# Patient Record
Sex: FEMALE | Race: BLACK OR AFRICAN AMERICAN | NOT HISPANIC OR LATINO | Employment: FULL TIME | URBAN - METROPOLITAN AREA
[De-identification: names, ages, dates, MRNs, and addresses within clinical notes are randomized per-mention and may not be internally consistent; named-entity substitution may affect disease eponyms.]

---

## 2017-10-24 ENCOUNTER — TELEPHONE (OUTPATIENT)
Dept: RADIOLOGY | Facility: HOSPITAL | Age: 32
End: 2017-10-24

## 2017-10-25 ENCOUNTER — HOSPITAL ENCOUNTER (OUTPATIENT)
Dept: RADIOLOGY | Facility: HOSPITAL | Age: 32
Discharge: HOME | End: 2017-10-25
Attending: INTERNAL MEDICINE
Payer: COMMERCIAL

## 2017-10-25 DIAGNOSIS — E04.9 GOITER: ICD-10-CM

## 2017-10-25 PROCEDURE — 76536 US EXAM OF HEAD AND NECK: CPT | Mod: TC,PO

## 2017-10-25 PROCEDURE — 76536 US EXAM OF HEAD AND NECK: CPT | Mod: 26,,, | Performed by: RADIOLOGY

## 2017-10-29 PROBLEM — E04.9 GOITER: Status: ACTIVE | Noted: 2017-10-29

## 2017-10-29 PROBLEM — Z00.00 ANNUAL PHYSICAL EXAM: Status: ACTIVE | Noted: 2017-10-29

## 2018-01-29 PROBLEM — Z00.00 ANNUAL PHYSICAL EXAM: Status: RESOLVED | Noted: 2017-10-29 | Resolved: 2018-01-29

## 2023-11-12 ENCOUNTER — OFFICE VISIT (OUTPATIENT)
Dept: URGENT CARE | Facility: CLINIC | Age: 38
End: 2023-11-12
Payer: COMMERCIAL

## 2023-11-12 VITALS
BODY MASS INDEX: 32.82 KG/M2 | WEIGHT: 162.81 LBS | HEART RATE: 84 BPM | TEMPERATURE: 99 F | SYSTOLIC BLOOD PRESSURE: 140 MMHG | HEIGHT: 59 IN | RESPIRATION RATE: 19 BRPM | DIASTOLIC BLOOD PRESSURE: 84 MMHG | OXYGEN SATURATION: 100 %

## 2023-11-12 DIAGNOSIS — L29.9 PRURITUS: ICD-10-CM

## 2023-11-12 DIAGNOSIS — B35.0 TINEA CAPITIS: Primary | ICD-10-CM

## 2023-11-12 PROCEDURE — 99203 PR OFFICE/OUTPT VISIT, NEW, LEVL III, 30-44 MIN: ICD-10-PCS | Mod: S$GLB,,, | Performed by: NURSE PRACTITIONER

## 2023-11-12 PROCEDURE — 99203 OFFICE O/P NEW LOW 30 MIN: CPT | Mod: S$GLB,,, | Performed by: NURSE PRACTITIONER

## 2023-11-12 RX ORDER — GRISEOFULVIN 250 MG/1
500 TABLET ORAL DAILY
Qty: 42 TABLET | Refills: 0 | Status: SHIPPED | OUTPATIENT
Start: 2023-11-12 | End: 2023-12-24

## 2023-11-12 RX ORDER — KETOCONAZOLE 20 MG/ML
SHAMPOO, SUSPENSION TOPICAL
Qty: 120 ML | Refills: 1 | Status: SHIPPED | OUTPATIENT
Start: 2023-11-13 | End: 2024-01-18

## 2023-11-12 NOTE — PATIENT INSTRUCTIONS
Tinea Capitis prevention discussed  Complete Griseofulvin course as directed-Consume with HIGH FAT MEAL  Ketoconazole shampoo 2 times weekly  Typical course and duration of illness discussed  Signs and symptoms of worsening discussed  Follow up as needed

## 2023-11-12 NOTE — PROGRESS NOTES
"Subjective:      Patient ID: Tayla Morgan is a 38 y.o. female.    Vitals:  height is 4' 11.45" (1.51 m) and weight is 73.9 kg (162 lb 13 oz). Her tympanic temperature is 99 °F (37.2 °C). Her blood pressure is 140/84 (abnormal) and her pulse is 84. Her respiration is 19 and oxygen saturation is 100%.     Chief Complaint: No chief complaint on file.    38 year old female presents for evaluation of itchy/flaky scalp x 3 months with progressive worsening. Reports recently wearing a weave x 11-12 weeks that exacerbated symptoms. States that her natural hair was braided underneath the weave, hair was washed and occasionally remained wet/moist underneath the weave. Weave was removed 11/1. Recent consult with beautician 11/2 suggested appearance of fungal infection and hair was washed with apple cider vinegar. Has been applying tea tree oil x 1 week without relief.    Hair/Scalp Problem  This is a new problem. The current episode started more than 1 month ago. The problem has been unchanged. Associated symptoms include a rash (scalp). Nothing aggravates the symptoms. She has tried nothing for the symptoms.       Constitution: Negative.   HENT: Negative.     Neck: neck negative.   Cardiovascular: Negative.    Eyes: Negative.    Respiratory: Negative.     Gastrointestinal: Negative.    Endocrine: negative.   Genitourinary: Negative.    Musculoskeletal: Negative.    Skin:  Positive for rash (scalp).   Allergic/Immunologic: Positive for itching (scalp).   Neurological: Negative.    Hematologic/Lymphatic: Negative.    Psychiatric/Behavioral: Negative.        Objective:     Physical Exam   Constitutional: She is oriented to person, place, and time. She is cooperative.  Non-toxic appearance. She does not appear ill. No distress. normalawake  HENT:   Head: Normocephalic and atraumatic. Hair is abnormal (thick scaly rash noted to scalp/occipital region).   Ears:   Right Ear: Hearing normal.   Left Ear: Hearing normal.   Eyes: " Conjunctivae are normal. Pupils are equal, round, and reactive to light. Right eye exhibits no discharge. Left eye exhibits no discharge. No scleral icterus. Extraocular movement intact   Neck: Neck supple.   Cardiovascular: Normal rate and regular rhythm.   Pulmonary/Chest: Effort normal. No respiratory distress.   Abdominal: Normal appearance.   Musculoskeletal: Normal range of motion.         General: Normal range of motion.   Neurological: no focal deficit. She is alert and oriented to person, place, and time.   Skin: Skin is warm, dry, not diaphoretic and rash (thick/scaly rash noted to scalp/occipital region).   Psychiatric: Her behavior is normal. Mood, judgment and thought content normal.   Nursing note and vitals reviewed.      Assessment:     1. Tinea capitis    2. Pruritus        Plan:     Patient presents with itchy scalp and rash that is consistent with tinea capitis. Plan is to treat fungal infection, manage symptoms and prevent worsening. Discussed with patient who verbalizes understanding.      Tinea capitis  -     griseofulvin (GRIFULVIN V) 500 mg tablet; Take 1 tablet (500 mg total) by mouth once daily.  Dispense: 42 tablet; Refill: 0  -     ketoconazole (NIZORAL) 2 % shampoo; Apply topically twice a week.  Dispense: 120 mL; Refill: 1    Pruritus                Patient Instructions   Tinea Capitis prevention discussed  Complete Griseofulvin course as directed-Consume with HIGH FAT MEAL  Ketoconazole shampoo 2 times weekly  Typical course and duration of illness discussed  Signs and symptoms of worsening discussed  Follow up as needed

## 2023-11-15 ENCOUNTER — TELEPHONE (OUTPATIENT)
Dept: URGENT CARE | Facility: CLINIC | Age: 38
End: 2023-11-15
Payer: COMMERCIAL